# Patient Record
Sex: MALE | Race: WHITE | NOT HISPANIC OR LATINO | Employment: FULL TIME | URBAN - METROPOLITAN AREA
[De-identification: names, ages, dates, MRNs, and addresses within clinical notes are randomized per-mention and may not be internally consistent; named-entity substitution may affect disease eponyms.]

---

## 2017-01-11 ENCOUNTER — HISTORICAL (OUTPATIENT)
Dept: LAB | Facility: HOSPITAL | Age: 47
End: 2017-01-11

## 2018-01-18 ENCOUNTER — HISTORICAL (OUTPATIENT)
Dept: LAB | Facility: HOSPITAL | Age: 48
End: 2018-01-18

## 2019-01-21 ENCOUNTER — HISTORICAL (OUTPATIENT)
Dept: LAB | Facility: HOSPITAL | Age: 49
End: 2019-01-21

## 2019-01-21 LAB
ABS NEUT (OLG): 5.93 X10(3)/MCL (ref 2.1–9.2)
ALBUMIN SERPL-MCNC: 4 GM/DL (ref 3.4–5)
ALBUMIN/GLOB SERPL: 1.3 {RATIO}
ALP SERPL-CCNC: 106 UNIT/L (ref 50–136)
ALT SERPL-CCNC: 34 UNIT/L (ref 12–78)
APPEARANCE, UA: CLEAR
AST SERPL-CCNC: 15 UNIT/L (ref 15–37)
BACTERIA SPEC CULT: NORMAL /HPF
BASOPHILS # BLD AUTO: 0 X10(3)/MCL (ref 0–0.2)
BASOPHILS NFR BLD AUTO: 0 %
BILIRUB SERPL-MCNC: 0.5 MG/DL (ref 0.2–1)
BILIRUB UR QL STRIP: NEGATIVE
BILIRUBIN DIRECT+TOT PNL SERPL-MCNC: 0.1 MG/DL (ref 0–0.2)
BILIRUBIN DIRECT+TOT PNL SERPL-MCNC: 0.4 MG/DL (ref 0–0.8)
BUN SERPL-MCNC: 20 MG/DL (ref 7–18)
CALCIUM SERPL-MCNC: 9 MG/DL (ref 8.5–10.1)
CHLORIDE SERPL-SCNC: 106 MMOL/L (ref 98–107)
CHOLEST SERPL-MCNC: 191 MG/DL (ref 0–200)
CHOLEST/HDLC SERPL: 3.9 {RATIO} (ref 0–5)
CO2 SERPL-SCNC: 23 MMOL/L (ref 21–32)
COLOR UR: YELLOW
CREAT SERPL-MCNC: 1.17 MG/DL (ref 0.7–1.3)
DEPRECATED CALCIDIOL+CALCIFEROL SERPL-MC: 17.59 NG/ML (ref 30–80)
EOSINOPHIL # BLD AUTO: 0 X10(3)/MCL (ref 0–0.9)
EOSINOPHIL NFR BLD AUTO: 0 %
ERYTHROCYTE [DISTWIDTH] IN BLOOD BY AUTOMATED COUNT: 13 % (ref 11.5–17)
EST. AVERAGE GLUCOSE BLD GHB EST-MCNC: 123 MG/DL
GLOBULIN SER-MCNC: 3.1 GM/DL (ref 2.4–3.5)
GLUCOSE (UA): NEGATIVE
GLUCOSE SERPL-MCNC: 102 MG/DL (ref 74–106)
HBA1C MFR BLD: 5.9 % (ref 4.2–6.3)
HCT VFR BLD AUTO: 48.3 % (ref 42–52)
HDLC SERPL-MCNC: 49 MG/DL (ref 35–60)
HGB BLD-MCNC: 15.7 GM/DL (ref 14–18)
HGB UR QL STRIP: NEGATIVE
INR PPP: 2.7 (ref 0–1.3)
KETONES UR QL STRIP: NEGATIVE
LDLC SERPL CALC-MCNC: 107 MG/DL (ref 0–129)
LEUKOCYTE ESTERASE UR QL STRIP: NEGATIVE
LYMPHOCYTES # BLD AUTO: 0.7 X10(3)/MCL (ref 0.6–4.6)
LYMPHOCYTES NFR BLD AUTO: 10 %
MCH RBC QN AUTO: 29.2 PG (ref 27–31)
MCHC RBC AUTO-ENTMCNC: 32.5 GM/DL (ref 33–36)
MCV RBC AUTO: 89.9 FL (ref 80–94)
MONOCYTES # BLD AUTO: 0.6 X10(3)/MCL (ref 0.1–1.3)
MONOCYTES NFR BLD AUTO: 8 %
NEUTROPHILS # BLD AUTO: 5.93 X10(3)/MCL (ref 2.1–9.2)
NEUTROPHILS NFR BLD AUTO: 81 %
NITRITE UR QL STRIP: NEGATIVE
PH UR STRIP: 5 [PH] (ref 5–9)
PLATELET # BLD AUTO: 151 X10(3)/MCL (ref 130–400)
PMV BLD AUTO: 12 FL (ref 9.4–12.4)
POTASSIUM SERPL-SCNC: 4.3 MMOL/L (ref 3.5–5.1)
PROT SERPL-MCNC: 7.1 GM/DL (ref 6.4–8.2)
PROT UR QL STRIP: NEGATIVE
PROTHROMBIN TIME: 29.4 SECOND(S) (ref 12.2–14.7)
PSA SERPL-MCNC: 1.12 NG/ML (ref 0–4)
RBC # BLD AUTO: 5.37 X10(6)/MCL (ref 4.7–6.1)
RBC #/AREA URNS HPF: NORMAL /[HPF]
SODIUM SERPL-SCNC: 140 MMOL/L (ref 136–145)
SP GR UR STRIP: 1.03 (ref 1–1.03)
SQUAMOUS EPITHELIAL, UA: NORMAL
TRIGL SERPL-MCNC: 177 MG/DL (ref 30–150)
TSH SERPL-ACNC: 1.87 MIU/L (ref 0.36–3.74)
UROBILINOGEN UR STRIP-ACNC: 0.2
VIT B12 SERPL-MCNC: 289 PG/ML (ref 193–986)
VLDLC SERPL CALC-MCNC: 35 MG/DL
WBC # SPEC AUTO: 7.3 X10(3)/MCL (ref 4.5–11.5)
WBC #/AREA URNS HPF: NORMAL /HPF

## 2020-01-22 ENCOUNTER — HISTORICAL (OUTPATIENT)
Dept: LAB | Facility: HOSPITAL | Age: 50
End: 2020-01-22

## 2022-01-14 ENCOUNTER — HISTORICAL (OUTPATIENT)
Dept: ADMINISTRATIVE | Facility: HOSPITAL | Age: 52
End: 2022-01-14

## 2022-01-14 LAB
ABS NEUT (OLG): 3.46 X10(3)/MCL (ref 2.1–9.2)
ALBUMIN SERPL-MCNC: 4 GM/DL (ref 3.5–5)
ALBUMIN/GLOB SERPL: 1.6 RATIO (ref 1.1–2)
ALP SERPL-CCNC: 121 UNIT/L (ref 40–150)
ALT SERPL-CCNC: 23 UNIT/L (ref 0–55)
APPEARANCE, UA: NORMAL
AST SERPL-CCNC: 18 UNIT/L (ref 5–34)
BACTERIA SPEC CULT: NORMAL /HPF
BASOPHILS # BLD AUTO: 0 X10(3)/MCL (ref 0–0.2)
BASOPHILS NFR BLD AUTO: 1 %
BILIRUB SERPL-MCNC: 0.7 MG/DL
BILIRUB UR QL STRIP: NEGATIVE
BILIRUBIN DIRECT+TOT PNL SERPL-MCNC: 0.2 MG/DL (ref 0–0.5)
BILIRUBIN DIRECT+TOT PNL SERPL-MCNC: 0.5 MG/DL (ref 0–0.8)
BUN SERPL-MCNC: 17.5 MG/DL (ref 8.4–25.7)
CALCIUM SERPL-MCNC: 9.1 MG/DL (ref 8.7–10.5)
CHLORIDE SERPL-SCNC: 107 MMOL/L (ref 98–107)
CHOLEST SERPL-MCNC: 189 MG/DL
CHOLEST/HDLC SERPL: 4 {RATIO} (ref 0–5)
CO2 SERPL-SCNC: 23 MMOL/L (ref 22–29)
COLOR UR: YELLOW
CREAT SERPL-MCNC: 0.96 MG/DL (ref 0.73–1.18)
DEPRECATED CALCIDIOL+CALCIFEROL SERPL-MC: 21.9 NG/ML (ref 30–80)
EOSINOPHIL # BLD AUTO: 0 X10(3)/MCL (ref 0–0.9)
EOSINOPHIL NFR BLD AUTO: 0 %
ERYTHROCYTE [DISTWIDTH] IN BLOOD BY AUTOMATED COUNT: 13.4 % (ref 11.5–17)
EST. AVERAGE GLUCOSE BLD GHB EST-MCNC: 111.2 MG/DL
GLOBULIN SER-MCNC: 2.5 GM/DL (ref 2.4–3.5)
GLUCOSE (UA): NEGATIVE
GLUCOSE SERPL-MCNC: 94 MG/DL (ref 74–100)
HBA1C MFR BLD: 5.5 %
HCT VFR BLD AUTO: 45.3 % (ref 42–52)
HDLC SERPL-MCNC: 48 MG/DL (ref 35–60)
HGB BLD-MCNC: 14.8 GM/DL (ref 14–18)
HGB UR QL STRIP: NEGATIVE
KETONES UR QL STRIP: NEGATIVE
LDLC SERPL CALC-MCNC: 125 MG/DL (ref 50–140)
LEUKOCYTE ESTERASE UR QL STRIP: NEGATIVE
LYMPHOCYTES # BLD AUTO: 0.9 X10(3)/MCL (ref 0.6–4.6)
LYMPHOCYTES NFR BLD AUTO: 19 %
MCH RBC QN AUTO: 30.3 PG (ref 27–31)
MCHC RBC AUTO-ENTMCNC: 32.7 GM/DL (ref 33–36)
MCV RBC AUTO: 92.6 FL (ref 80–94)
MONOCYTES # BLD AUTO: 0.5 X10(3)/MCL (ref 0.1–1.3)
MONOCYTES NFR BLD AUTO: 10 %
NEUTROPHILS # BLD AUTO: 3.46 X10(3)/MCL (ref 2.1–9.2)
NEUTROPHILS NFR BLD AUTO: 70 %
NITRITE UR QL STRIP: NEGATIVE
PH UR STRIP: 6 [PH] (ref 5–9)
PLATELET # BLD AUTO: 150 X10(3)/MCL (ref 130–400)
PMV BLD AUTO: 11.7 FL (ref 9.4–12.4)
POTASSIUM SERPL-SCNC: 4.1 MMOL/L (ref 3.5–5.1)
PROT SERPL-MCNC: 6.5 GM/DL (ref 6.4–8.3)
PROT UR QL STRIP: NEGATIVE
PSA SERPL-MCNC: 1.23 NG/ML
RBC # BLD AUTO: 4.89 X10(6)/MCL (ref 4.7–6.1)
RBC #/AREA URNS HPF: NORMAL /[HPF]
SODIUM SERPL-SCNC: 141 MMOL/L (ref 136–145)
SP GR UR STRIP: >1.03 (ref 1–1.03)
SQUAMOUS EPITHELIAL, UA: NORMAL /HPF
TRIGL SERPL-MCNC: 79 MG/DL (ref 34–140)
TSH SERPL-ACNC: 0.8 UIU/ML (ref 0.35–4.94)
UROBILINOGEN UR STRIP-ACNC: 0.2
VLDLC SERPL CALC-MCNC: 16 MG/DL
WBC # SPEC AUTO: 4.9 X10(3)/MCL (ref 4.5–11.5)
WBC #/AREA URNS HPF: NORMAL /HPF

## 2022-04-07 ENCOUNTER — HISTORICAL (OUTPATIENT)
Dept: ADMINISTRATIVE | Facility: HOSPITAL | Age: 52
End: 2022-04-07
Payer: COMMERCIAL

## 2022-04-23 VITALS
BODY MASS INDEX: 25.08 KG/M2 | HEIGHT: 72 IN | OXYGEN SATURATION: 98 % | DIASTOLIC BLOOD PRESSURE: 64 MMHG | WEIGHT: 185.19 LBS | SYSTOLIC BLOOD PRESSURE: 110 MMHG

## 2022-12-07 ENCOUNTER — TELEPHONE (OUTPATIENT)
Dept: FAMILY MEDICINE | Facility: CLINIC | Age: 52
End: 2022-12-07
Payer: COMMERCIAL

## 2022-12-07 NOTE — TELEPHONE ENCOUNTER
----- Message from Karsten Marie sent at 12/7/2022 11:16 AM CST -----  .Type:  Needs Medical Advice    Who Called: Artur   Symptoms (please be specific):    How long has patient had these symptoms:    Pharmacy name and phone #:    Would the patient rather a call back or a response via [facility]?   Best Call Back Number: [phone]  Additional Information: He needs a call back from the nurse re: if he ever had a whooping cough vaccine.

## 2022-12-13 ENCOUNTER — PATIENT OUTREACH (OUTPATIENT)
Dept: ADMINISTRATIVE | Facility: HOSPITAL | Age: 52
End: 2022-12-13
Payer: COMMERCIAL

## 2023-01-30 ENCOUNTER — OFFICE VISIT (OUTPATIENT)
Dept: FAMILY MEDICINE | Facility: CLINIC | Age: 53
End: 2023-01-30
Payer: COMMERCIAL

## 2023-01-30 VITALS
SYSTOLIC BLOOD PRESSURE: 112 MMHG | DIASTOLIC BLOOD PRESSURE: 71 MMHG | BODY MASS INDEX: 24.74 KG/M2 | OXYGEN SATURATION: 98 % | HEART RATE: 52 BPM | WEIGHT: 182.38 LBS

## 2023-01-30 DIAGNOSIS — Z12.5 SCREENING PSA (PROSTATE SPECIFIC ANTIGEN): ICD-10-CM

## 2023-01-30 DIAGNOSIS — E55.9 VITAMIN D DEFICIENCY: ICD-10-CM

## 2023-01-30 DIAGNOSIS — Z00.00 WELLNESS EXAMINATION: Primary | ICD-10-CM

## 2023-01-30 DIAGNOSIS — I70.244 ATHEROSCLEROSIS OF NATIVE ARTERIES OF LEFT LEG WITH ULCERATION OF HEEL AND MIDFOOT: ICD-10-CM

## 2023-01-30 DIAGNOSIS — Z23 FLU VACCINE NEED: ICD-10-CM

## 2023-01-30 DIAGNOSIS — Z79.01 CURRENT USE OF LONG TERM ANTICOAGULATION: ICD-10-CM

## 2023-01-30 DIAGNOSIS — I82.4Y3 DEEP VEIN THROMBOSIS (DVT) OF PROXIMAL VEIN OF BOTH LOWER EXTREMITIES, UNSPECIFIED CHRONICITY: ICD-10-CM

## 2023-01-30 DIAGNOSIS — Z86.711 HISTORY OF PULMONARY EMBOLUS (PE): ICD-10-CM

## 2023-01-30 PROCEDURE — 1159F MED LIST DOCD IN RCRD: CPT | Mod: CPTII,,, | Performed by: FAMILY MEDICINE

## 2023-01-30 PROCEDURE — 3074F PR MOST RECENT SYSTOLIC BLOOD PRESSURE < 130 MM HG: ICD-10-PCS | Mod: CPTII,,, | Performed by: FAMILY MEDICINE

## 2023-01-30 PROCEDURE — 3078F DIAST BP <80 MM HG: CPT | Mod: CPTII,,, | Performed by: FAMILY MEDICINE

## 2023-01-30 PROCEDURE — 90686 IIV4 VACC NO PRSV 0.5 ML IM: CPT | Mod: ,,, | Performed by: FAMILY MEDICINE

## 2023-01-30 PROCEDURE — 3008F BODY MASS INDEX DOCD: CPT | Mod: CPTII,,, | Performed by: FAMILY MEDICINE

## 2023-01-30 PROCEDURE — 90471 IMMUNIZATION ADMIN: CPT | Mod: ,,, | Performed by: FAMILY MEDICINE

## 2023-01-30 PROCEDURE — 99396 PR PREVENTIVE VISIT,EST,40-64: ICD-10-PCS | Mod: 25,,, | Performed by: FAMILY MEDICINE

## 2023-01-30 PROCEDURE — 1159F PR MEDICATION LIST DOCUMENTED IN MEDICAL RECORD: ICD-10-PCS | Mod: CPTII,,, | Performed by: FAMILY MEDICINE

## 2023-01-30 PROCEDURE — 99396 PREV VISIT EST AGE 40-64: CPT | Mod: 25,,, | Performed by: FAMILY MEDICINE

## 2023-01-30 PROCEDURE — 1160F PR REVIEW ALL MEDS BY PRESCRIBER/CLIN PHARMACIST DOCUMENTED: ICD-10-PCS | Mod: CPTII,,, | Performed by: FAMILY MEDICINE

## 2023-01-30 PROCEDURE — 3078F PR MOST RECENT DIASTOLIC BLOOD PRESSURE < 80 MM HG: ICD-10-PCS | Mod: CPTII,,, | Performed by: FAMILY MEDICINE

## 2023-01-30 PROCEDURE — 90686 PR FLU VACCINE, QIIV4, NO PRSV, 0.5 ML, IM: ICD-10-PCS | Mod: ,,, | Performed by: FAMILY MEDICINE

## 2023-01-30 PROCEDURE — 3008F PR BODY MASS INDEX (BMI) DOCUMENTED: ICD-10-PCS | Mod: CPTII,,, | Performed by: FAMILY MEDICINE

## 2023-01-30 PROCEDURE — 90471 PR IMMUNIZ ADMIN,1 SINGLE/COMB VAC/TOXOID: ICD-10-PCS | Mod: ,,, | Performed by: FAMILY MEDICINE

## 2023-01-30 PROCEDURE — 1160F RVW MEDS BY RX/DR IN RCRD: CPT | Mod: CPTII,,, | Performed by: FAMILY MEDICINE

## 2023-01-30 PROCEDURE — 3074F SYST BP LT 130 MM HG: CPT | Mod: CPTII,,, | Performed by: FAMILY MEDICINE

## 2023-01-30 RX ORDER — WARFARIN 4 MG/1
4 TABLET ORAL DAILY
COMMUNITY
Start: 2022-12-30

## 2023-01-30 NOTE — PROGRESS NOTES
Patient ID: [id number]     Chief Complaint: WELLNESS        HPI:     Artur Oquendo III is a 52 y.o. male here today for annual wellness exam.  Well Adult History   The patient presents for well adult exam, The patient's general health status is described as good. The patient's diet is described as balanced. Exercise: occasional. Associated symptoms consist of denies weight loss, denies weight gain, denies fatigue, denies headache, reports hearing loss (chronic and stable) and denies vision changes. Additional pertinent history: last dental exam: goes every 6 months, last eye exam: 02/2022, with Ophthalmology- Dr. Diaz), seat belt use, occasional caffeine use (tea/soft drinks), tobacco use none, social alcohol use and He is due for annual wellness labs, he would like HIV and Hep C screening with labs. Last Cologuard: 08/04/2021, Negative. He needs flu vaccine today, but he is UTD on all other vaccines. He is compliant with Vascular (Dr. Mcdonough) followup as scheduled for Coumadin management due to history of PE/DVT s/p IV Filter/atherosclerosis with healed wound on left foot, asymptomatic. Her reports that sleep apnea symptoms resolved s/p deviated septum repair with ENT (Dr. Tanner), he does followup with ENT as scheduled. He has hearing loss and sees [facility] and balance for hearing aids.  - Patient is without any other complaints today.    Advance Care Planning     Date: 01/30/2023  Patient did not wish or was not able to name a surrogate decision maker or provide an Advance Care Plan.      No past medical history on file.     History reviewed. No pertinent surgical history.    Review of patient's allergies indicates:  No Known Allergies    No outpatient medications have been marked as taking for the 1/30/23 encounter (Office Visit) with Stacy Kemp MD.     Current Facility-Administered Medications for the 1/30/23 encounter (Office Visit) with Stacy Kemp MD   Medication Dose  Route Frequency Provider Last Rate Last Admin    influenza (QUADRIVALENT PF) vaccine 0.5 mL  0.5 mL Intramuscular vaccine x 1 dose Stacy Kemp MD           Social History     Socioeconomic History    Marital status: Other   Tobacco Use    Smoking status: Never    Smokeless tobacco: Never   Substance and Sexual Activity    Alcohol use: Never    Drug use: Never    Sexual activity: Yes        History reviewed. No pertinent family history.     Subjective:       Review of Systems:    See HPI for details    Constitutional: No fatigue, No fever, No chills.   Eye: No blurring, No visual disturbances.   Ear/Nose/Mouth/Throat: No ear pain, No nasal congestion, No sore throat Decreased hearing: Bilaterally.   Respiratory: No shortness of breath, No cough, No wheezing.   Cardiovascular: No chest pain, No palpitations, No peripheral edema.   Gastrointestinal: No nausea, No vomiting, No diarrhea, No constipation, No abdominal pain.   Genitourinary: No dysuria, No hematuria.   Hematology/Lymphatics: No bruising tendency, No bleeding tendency, No swollen lymph glands.   Endocrine: No excessive thirst, No polyuria, No excessive hunger.   Musculoskeletal: No joint pain, No muscle pain, No decreased range of motion.   Integumentary: No rash, No pruritus.   Neurologic: No abnormal balance, No confusion, No headache.   Psychiatric: No sleeping problems, No anxiety, No depression, Not suicidal, No hallucinations.     All Other ROS: Negative except as stated in HPI.       Objective:     /71 (BP Location: Right arm, Patient Position: Sitting, BP Method: Large (Automatic))   Pulse (!) 52   Wt 82.7 kg (182 lb 6.4 oz)   SpO2 98%   BMI 24.74 kg/m²     Physical Exam    General: Alert and oriented, No acute distress.   Eye: Pupils are equal, round and reactive to light, Extraocular movements are intact, Normal conjunctiva.   HENT: Normocephalic, Tympanic membranes are clear, Normal hearing, Oral mucosa is moist, No pharyngeal  erythema, Hearing aids in place.   Throat: Pharynx ( Not edematous, No exudate ).   Neck: Supple, Non-tender, No carotid bruit, No lymphadenopathy, No thyromegaly.   Respiratory: Lungs are clear to auscultation, Respirations are non-labored, Breath sounds are equal, Symmetrical chest wall expansion, No chest wall tenderness.   Cardiovascular: Normal rate, Regular rhythm, No murmur, Good pulses equal in all extremities, No edema.   Gastrointestinal: Soft, Non-tender, Non-distended, Normal bowel sounds, No organomegaly.   Genitourinary: No costovertebral angle tenderness.   Musculoskeletal: Normal range of motion, No tenderness, Normal gait.   Lower extremity exam: Lower leg ( Bilateral, Moderate, Varicose veins, venous stasis changes ).   Neurologic: No focal deficits, Cranial Nerves II-XII are grossly intact.   Psychiatric: Cooperative, Appropriate mood & affect, Normal judgment, Non-suicidal.   Mood and affect: Calm.   Behavior: Relaxed.        Assessment:       ICD-10-CM ICD-9-CM   1. Wellness examination  Z00.00 V70.0   2. Screening PSA (prostate specific antigen)  Z12.5 V76.44   3. Vitamin D deficiency  E55.9 268.9   4. Flu vaccine need  Z23 V04.81   5. Deep vein thrombosis (DVT) of proximal vein of both lower extremities, unspecified chronicity  I82.4Y3 453.41   6. History of pulmonary embolus (PE)  Z86.711 V12.55   7. Atherosclerosis of native arteries of left leg with ulceration of heel and midfoot  I70.244 440.23     707.10   8. Current use of long term anticoagulation  Z79.01 V58.61        Plan:     Problem List Items Addressed This Visit          Cardiac/Vascular    Atherosclerosis of native arteries of left leg with ulceration of heel and midfoot     Other Visit Diagnoses       Wellness examination    -  Primary    Relevant Orders    CBC Auto Differential    Comprehensive Metabolic Panel    Hemoglobin A1C    Lipid Panel    TSH    Urinalysis, Reflex to Urine Culture    HIV 1/2 Ag/Ab (4th Gen)    Hepatitis  C Antibody    Screening PSA (prostate specific antigen)        Relevant Orders    PSA, Screening    Vitamin D deficiency        Relevant Orders    Vitamin D    Flu vaccine need        Relevant Medications    influenza (QUADRIVALENT PF) vaccine 0.5 mL (Start on 1/30/2023 11:03 AM)    Deep vein thrombosis (DVT) of proximal vein of both lower extremities, unspecified chronicity        History of pulmonary embolus (PE)        Current use of long term anticoagulation        Relevant Orders    Protime-INR         1. Wellness examination  - CBC Auto Differential; Future  - Comprehensive Metabolic Panel; Future  - Hemoglobin A1C; Future  - Lipid Panel; Future  - TSH; Future  - Urinalysis, Reflex to Urine Culture; Future  - HIV 1/2 Ag/Ab (4th Gen); Future  - Hepatitis C Antibody; Future  - Will treat pending lab results. Continue healthy diet and exercise plan. Keep appointment for dental exams x q6 months as scheduled. Keep appointment for annual eye exam as scheduled. Keep appointment with specialists as scheduled. Notify M.D. or ER if temp greater than 100.4, or any acute illness.      2. Screening PSA (prostate specific antigen)  - PSA, Screening; Future    3. Vitamin D deficiency  - Vitamin D; Future      4. Flu vaccine need  - influenza (QUADRIVALENT PF) vaccine 0.5 mL IM x 1 today.    5. Deep vein thrombosis (DVT) of proximal vein of both lower extremities, unspecified chronicity  Continue followup with Vascular as scheduled and continue current treatment plan. Notify M.D. or ER if symptoms persist or worsen, SOB, extremity swelling, active bleeding, temp >100.4, or any acute illness.      6. History of pulmonary embolus (PE)  - Same as #5.     7. Atherosclerosis of native arteries of left leg with ulceration of heel and midfoot  - Same as #5.     8. Current use of long term anticoagulation  - Protime-INR; Future  - Same as #5.       Artur was seen today for wellness.    Diagnoses and all orders for this  visit:    Wellness examination  -     CBC Auto Differential; Future  -     Comprehensive Metabolic Panel; Future  -     Hemoglobin A1C; Future  -     Lipid Panel; Future  -     TSH; Future  -     Urinalysis, Reflex to Urine Culture; Future  -     HIV 1/2 Ag/Ab (4th Gen); Future  -     Hepatitis C Antibody; Future    Screening PSA (prostate specific antigen)  -     PSA, Screening; Future    Vitamin D deficiency  -     Vitamin D; Future    Flu vaccine need  -     influenza (QUADRIVALENT PF) vaccine 0.5 mL    Deep vein thrombosis (DVT) of proximal vein of both lower extremities, unspecified chronicity    History of pulmonary embolus (PE)    Atherosclerosis of native arteries of left leg with ulceration of heel and midfoot    Current use of long term anticoagulation  -     Protime-INR; Future          Medication List with Changes/Refills   Current Medications    WARFARIN (COUMADIN) 4 MG TABLET    Take 4 mg by mouth Daily.       Start Date: 12/30/2022End Date: --          Follow up in about 1 year (around 1/30/2024) for Wellness.

## 2024-01-31 ENCOUNTER — OFFICE VISIT (OUTPATIENT)
Dept: FAMILY MEDICINE | Facility: CLINIC | Age: 54
End: 2024-01-31
Payer: COMMERCIAL

## 2024-01-31 VITALS
HEART RATE: 51 BPM | WEIGHT: 190 LBS | DIASTOLIC BLOOD PRESSURE: 74 MMHG | SYSTOLIC BLOOD PRESSURE: 136 MMHG | RESPIRATION RATE: 17 BRPM | OXYGEN SATURATION: 99 % | BODY MASS INDEX: 25.73 KG/M2 | HEIGHT: 72 IN

## 2024-01-31 DIAGNOSIS — Z12.11 SCREENING FOR COLON CANCER: ICD-10-CM

## 2024-01-31 DIAGNOSIS — I82.4Y3 DEEP VEIN THROMBOSIS (DVT) OF PROXIMAL VEIN OF BOTH LOWER EXTREMITIES, UNSPECIFIED CHRONICITY: ICD-10-CM

## 2024-01-31 DIAGNOSIS — Z00.00 WELLNESS EXAMINATION: Primary | ICD-10-CM

## 2024-01-31 DIAGNOSIS — Z79.01 CURRENT USE OF LONG TERM ANTICOAGULATION: ICD-10-CM

## 2024-01-31 DIAGNOSIS — I70.244 ATHEROSCLEROSIS OF NATIVE ARTERIES OF LEFT LEG WITH ULCERATION OF HEEL AND MIDFOOT: ICD-10-CM

## 2024-01-31 DIAGNOSIS — E55.9 VITAMIN D DEFICIENCY: ICD-10-CM

## 2024-01-31 DIAGNOSIS — Z23 FLU VACCINE NEED: ICD-10-CM

## 2024-01-31 DIAGNOSIS — Z12.5 SCREENING PSA (PROSTATE SPECIFIC ANTIGEN): ICD-10-CM

## 2024-01-31 PROCEDURE — 1159F MED LIST DOCD IN RCRD: CPT | Mod: CPTII,,, | Performed by: FAMILY MEDICINE

## 2024-01-31 PROCEDURE — 3075F SYST BP GE 130 - 139MM HG: CPT | Mod: CPTII,,, | Performed by: FAMILY MEDICINE

## 2024-01-31 PROCEDURE — 1160F RVW MEDS BY RX/DR IN RCRD: CPT | Mod: CPTII,,, | Performed by: FAMILY MEDICINE

## 2024-01-31 PROCEDURE — 3008F BODY MASS INDEX DOCD: CPT | Mod: CPTII,,, | Performed by: FAMILY MEDICINE

## 2024-01-31 PROCEDURE — 99396 PREV VISIT EST AGE 40-64: CPT | Mod: 25,,, | Performed by: FAMILY MEDICINE

## 2024-01-31 PROCEDURE — 90471 IMMUNIZATION ADMIN: CPT | Mod: ,,, | Performed by: FAMILY MEDICINE

## 2024-01-31 PROCEDURE — 3078F DIAST BP <80 MM HG: CPT | Mod: CPTII,,, | Performed by: FAMILY MEDICINE

## 2024-01-31 PROCEDURE — 90686 IIV4 VACC NO PRSV 0.5 ML IM: CPT | Mod: ,,, | Performed by: FAMILY MEDICINE

## 2024-01-31 NOTE — PATIENT INSTRUCTIONS
Sukhwinder Siddiqui,     If you are due for any health screening(s) below please notify me so we can arrange them to be ordered and scheduled. Most healthy patients at your age complete them, but you are free to accept or refuse.     If you can't do it, I'll definitely understand. If you can, I'd certainly appreciate it!    All of your core healthy metrics are met.

## 2024-01-31 NOTE — PROGRESS NOTES
Patient ID: [id number]     Chief Complaint: Annual Exam        HPI:     Artur Oquendo III is a 53 y.o. male here today for annual wellness exam.   Well Adult History   The patient presents for well adult exam, The patient's general health status is described as good. The patient's diet is described as balanced. Exercise: occasional. Associated symptoms consist of denies weight loss, denies weight gain, denies fatigue, denies headache, reports hearing loss (chronic and stable) and denies vision changes. Additional pertinent history: last dental exam: goes every 6 months, last eye exam: 08/2023, with Ophthalmology- Dr. Diaz), seat belt use, occasional caffeine use (tea/soft drinks), tobacco use none, social alcohol use and He is due for annual wellness labs, he would like HIV and Hep C screening with labs. Last Cologuard: 08/04/2021, Negative, due for repeat in 08/2024. He needs flu vaccine today, but he is UTD on all other vaccines. He is compliant with Vascular (Dr. Mcdonough) followup as scheduled for Coumadin management due to history of PE/DVT s/p IV Filter/atherosclerosis with healed wound on left foot, asymptomatic, stable with Rx, no side effects. Her reports that sleep apnea symptoms resolved s/p deviated septum repair with ENT (Dr. Tanner), he does followup with ENT as scheduled. He has hearing loss and sees [facility] and [facility] for hearing aids.  - Patient is without any other complaints today.     Advance Care Planning     Date: 01/31/2024  Patient did not wish or was not able to name a surrogate decision maker or provide an Advance Care Plan.            No past medical history on file.     History reviewed. No pertinent surgical history.    Review of patient's allergies indicates:  No Known Allergies    Outpatient Medications Marked as Taking for the 1/31/24 encounter (Office Visit) with Stacy Kemp MD   Medication Sig Dispense Refill    warfarin (COUMADIN) 4 MG tablet Take 4 mg by  mouth Daily.       Current Facility-Administered Medications for the 1/31/24 encounter (Office Visit) with Stacy Kemp MD   Medication Dose Route Frequency Provider Last Rate Last Admin    influenza (QUADRIVALENT PF) vaccine 0.5 mL  0.5 mL Intramuscular vaccine x 1 dose Stacy Kemp MD           Social History     Socioeconomic History    Marital status: Other   Tobacco Use    Smoking status: Never    Smokeless tobacco: Never   Substance and Sexual Activity    Alcohol use: Not Currently     Comment: Once in a while    Drug use: Never    Sexual activity: Yes     Partners: Female     Birth control/protection: None        History reviewed. No pertinent family history.     Subjective:       Review of Systems:    See HPI for details    Constitutional: No fatigue, No fever, No chills.   Eye: No blurring, No visual disturbances.   Ear/Nose/Mouth/Throat: No ear pain, No nasal congestion, No sore throat. Decreased hearing: Bilaterally.   Respiratory: No shortness of breath, No cough, No wheezing.   Cardiovascular: No chest pain, No palpitations, No peripheral edema.   Gastrointestinal: No nausea, No vomiting, No diarrhea, No constipation, No abdominal pain.   Genitourinary: No dysuria, No hematuria.   Hematology/Lymphatics: No bruising tendency, No bleeding tendency, No swollen lymph glands.   Endocrine: No excessive thirst, No polyuria, No excessive hunger.   Musculoskeletal: No joint pain, No muscle pain, No decreased range of motion.   Integumentary: No rash, No pruritus.   Neurologic: No abnormal balance, No confusion, No headache.   Psychiatric: No sleeping problems, No anxiety, No depression, Not suicidal, No hallucinations.     All Other ROS: Negative except as stated in HPI.       Objective:     /74 (BP Location: Left arm, Patient Position: Sitting, BP Method: Medium (Automatic))   Pulse (!) 51   Resp 17   Ht 6' (1.829 m)   Wt 86.2 kg (190 lb)   SpO2 99%   BMI 25.77 kg/m²     Physical  Exam    General: Alert and oriented, No acute distress.   Eye: Pupils are equal, round and reactive to light, Extraocular movements are intact, Normal conjunctiva.   HENT: Normocephalic, Tympanic membranes are clear, Normal hearing, Oral mucosa is moist, No pharyngeal erythema, Hearing aids in place.   Throat: Pharynx ( Not edematous, No exudate ).   Neck: Supple, Non-tender, No carotid bruit, No lymphadenopathy, No thyromegaly.   Respiratory: Lungs are clear to auscultation, Respirations are non-labored, Breath sounds are equal, Symmetrical chest wall expansion, No chest wall tenderness.   Cardiovascular: Normal rate, Regular rhythm, No murmur, Good pulses equal in all extremities, No edema.   Gastrointestinal: Soft, Non-tender, Non-distended, Normal bowel sounds, No organomegaly.   Genitourinary: No costovertebral angle tenderness.   Musculoskeletal: Normal range of motion, No tenderness, Normal gait.   Lower extremity exam: Lower leg ( Bilateral, Moderate, Varicose veins, venous stasis changes ).   Neurologic: No focal deficits, Cranial Nerves II-XII are grossly intact.   Psychiatric: Cooperative, Appropriate mood & affect, Normal judgment, Non-suicidal.   Mood and affect: Calm.   Behavior: Relaxed.         Assessment:       ICD-10-CM ICD-9-CM   1. Wellness examination  Z00.00 V70.0   2. Screening PSA (prostate specific antigen)  Z12.5 V76.44   3. Screening for colon cancer  Z12.11 V76.51   4. Vitamin D deficiency  E55.9 268.9   5. Flu vaccine need  Z23 V04.81   6. Deep vein thrombosis (DVT) of proximal vein of both lower extremities, unspecified chronicity  I82.4Y3 453.41   7. Atherosclerosis of native arteries of left leg with ulceration of heel and midfoot  I70.244 440.23     707.10   8. Current use of long term anticoagulation  Z79.01 V58.61        Plan:     Problem List Items Addressed This Visit          Cardiac/Vascular    Atherosclerosis of native arteries of left leg with ulceration of heel and midfoot     Relevant Orders    Protime-INR    APTT     Other Visit Diagnoses       Wellness examination    -  Primary    Relevant Orders    Hepatitis C Antibody    HIV 1/2 Ag/Ab (4th Gen)    CBC Auto Differential    Comprehensive Metabolic Panel    Hemoglobin A1C    Lipid Panel    TSH    Urinalysis, Reflex to Urine Culture    Screening PSA (prostate specific antigen)        Relevant Orders    PSA, Screening    Screening for colon cancer        Relevant Orders    Cologuard Screening (Multitarget Stool DNA)    Vitamin D deficiency        Relevant Orders    Vitamin D    Flu vaccine need        Relevant Medications    influenza (QUADRIVALENT PF) vaccine 0.5 mL (Start on 1/31/2024  9:44 AM)    Deep vein thrombosis (DVT) of proximal vein of both lower extremities, unspecified chronicity        Relevant Orders    Protime-INR    APTT    Current use of long term anticoagulation        Relevant Orders    Protime-INR    APTT         1. Wellness examination  - Hepatitis C Antibody; Future  - HIV 1/2 Ag/Ab (4th Gen); Future  - CBC Auto Differential; Future  - Comprehensive Metabolic Panel; Future  - Hemoglobin A1C; Future  - Lipid Panel; Future  - TSH; Future  - Urinalysis, Reflex to Urine Culture; Future  - Will treat pending lab results. Continue healthy diet and exercise plan. Keep appointment for dental exams x q6 months as scheduled. Keep appointment for annual eye exam as scheduled. Keep appointment with specialists as scheduled. Notify M.D. or ER if temp greater than 100.4, or any acute illness.       2. Screening PSA (prostate specific antigen)  - PSA, Screening; Future    3. Screening for colon cancer  - Cologuard Screening (Multitarget Stool DNA); Future  - Cologuard Screening (Multitarget Stool DNA)    4. Vitamin D deficiency  - Vitamin D; Future    5. Flu vaccine need  - influenza (QUADRIVALENT PF) vaccine 0.5 mL IM x 1 today.     6. Deep vein thrombosis (DVT) of proximal vein of both lower extremities, unspecified  chronicity  - Protime-INR; Future  - APTT; Future  - Continue followup with Vascular as scheduled and continue current treatment plan. Notify M.D. or ER if symptoms persist or worsen, SOB, extremity swelling, active bleeding, temp >100.4, or any acute illness.       7. Atherosclerosis of native arteries of left leg with ulceration of heel and midfoot  - Protime-INR; Future  - APTT; Future  - Same as #6.     8. Current use of long term anticoagulation  - Protime-INR; Future  - APTT; Future  - Same as #6.       Artur was seen today for annual exam.    Diagnoses and all orders for this visit:    Wellness examination  -     Hepatitis C Antibody; Future  -     HIV 1/2 Ag/Ab (4th Gen); Future  -     CBC Auto Differential; Future  -     Comprehensive Metabolic Panel; Future  -     Hemoglobin A1C; Future  -     Lipid Panel; Future  -     TSH; Future  -     Urinalysis, Reflex to Urine Culture; Future    Screening PSA (prostate specific antigen)  -     PSA, Screening; Future    Screening for colon cancer  -     Cologuard Screening (Multitarget Stool DNA); Future  -     Cologuard Screening (Multitarget Stool DNA)    Vitamin D deficiency  -     Vitamin D; Future    Flu vaccine need  -     influenza (QUADRIVALENT PF) vaccine 0.5 mL    Deep vein thrombosis (DVT) of proximal vein of both lower extremities, unspecified chronicity  -     Protime-INR; Future  -     APTT; Future    Atherosclerosis of native arteries of left leg with ulceration of heel and midfoot  -     Protime-INR; Future  -     APTT; Future    Current use of long term anticoagulation  -     Protime-INR; Future  -     APTT; Future          Medication List with Changes/Refills   Current Medications    WARFARIN (COUMADIN) 4 MG TABLET    Take 4 mg by mouth Daily.       Start Date: 12/30/2022End Date: --          Follow up in about 1 year (around 1/31/2025) for Wellness.

## 2024-02-09 ENCOUNTER — LAB VISIT (OUTPATIENT)
Dept: LAB | Facility: HOSPITAL | Age: 54
End: 2024-02-09
Attending: FAMILY MEDICINE
Payer: COMMERCIAL

## 2024-02-09 DIAGNOSIS — Z79.01 CURRENT USE OF LONG TERM ANTICOAGULATION: ICD-10-CM

## 2024-02-09 DIAGNOSIS — Z12.5 SCREENING PSA (PROSTATE SPECIFIC ANTIGEN): ICD-10-CM

## 2024-02-09 DIAGNOSIS — I82.4Y3 DEEP VEIN THROMBOSIS (DVT) OF PROXIMAL VEIN OF BOTH LOWER EXTREMITIES, UNSPECIFIED CHRONICITY: ICD-10-CM

## 2024-02-09 DIAGNOSIS — Z00.00 WELLNESS EXAMINATION: ICD-10-CM

## 2024-02-09 DIAGNOSIS — E55.9 VITAMIN D DEFICIENCY: ICD-10-CM

## 2024-02-09 DIAGNOSIS — I70.244 ATHEROSCLEROSIS OF NATIVE ARTERIES OF LEFT LEG WITH ULCERATION OF HEEL AND MIDFOOT: ICD-10-CM

## 2024-02-09 LAB
ALBUMIN SERPL-MCNC: 4 G/DL (ref 3.5–5)
ALBUMIN/GLOB SERPL: 1.5 RATIO (ref 1.1–2)
ALP SERPL-CCNC: 98 UNIT/L (ref 40–150)
ALT SERPL-CCNC: 18 UNIT/L (ref 0–55)
APPEARANCE UR: CLEAR
APTT PPP: 28.8 SECONDS (ref 23.2–33.7)
AST SERPL-CCNC: 15 UNIT/L (ref 5–34)
BACTERIA #/AREA URNS AUTO: ABNORMAL /HPF
BASOPHILS # BLD AUTO: 0.03 X10(3)/MCL
BASOPHILS NFR BLD AUTO: 0.6 %
BILIRUB SERPL-MCNC: 0.5 MG/DL
BILIRUB UR QL STRIP.AUTO: NEGATIVE
BUN SERPL-MCNC: 23.9 MG/DL (ref 8.4–25.7)
CALCIUM SERPL-MCNC: 9 MG/DL (ref 8.4–10.2)
CHLORIDE SERPL-SCNC: 108 MMOL/L (ref 98–107)
CHOLEST SERPL-MCNC: 205 MG/DL
CHOLEST/HDLC SERPL: 4 {RATIO} (ref 0–5)
CO2 SERPL-SCNC: 24 MMOL/L (ref 22–29)
COLOR UR AUTO: ABNORMAL
CREAT SERPL-MCNC: 0.98 MG/DL (ref 0.73–1.18)
DEPRECATED CALCIDIOL+CALCIFEROL SERPL-MC: 26 NG/ML (ref 30–80)
EOSINOPHIL # BLD AUTO: 0.04 X10(3)/MCL (ref 0–0.9)
EOSINOPHIL NFR BLD AUTO: 0.7 %
ERYTHROCYTE [DISTWIDTH] IN BLOOD BY AUTOMATED COUNT: 13 % (ref 11.5–17)
EST. AVERAGE GLUCOSE BLD GHB EST-MCNC: 108.3 MG/DL
GFR SERPLBLD CREATININE-BSD FMLA CKD-EPI: >60 MLS/MIN/1.73/M2
GLOBULIN SER-MCNC: 2.7 GM/DL (ref 2.4–3.5)
GLUCOSE SERPL-MCNC: 100 MG/DL (ref 74–100)
GLUCOSE UR QL STRIP.AUTO: NORMAL
HBA1C MFR BLD: 5.4 %
HCT VFR BLD AUTO: 48.3 % (ref 42–52)
HDLC SERPL-MCNC: 50 MG/DL (ref 35–60)
HGB BLD-MCNC: 15.1 G/DL (ref 14–18)
HIV 1+2 AB+HIV1 P24 AG SERPL QL IA: NONREACTIVE
IMM GRANULOCYTES # BLD AUTO: 0.04 X10(3)/MCL (ref 0–0.04)
IMM GRANULOCYTES NFR BLD AUTO: 0.7 %
INR PPP: 1.5
KETONES UR QL STRIP.AUTO: NEGATIVE
LDLC SERPL CALC-MCNC: 143 MG/DL (ref 50–140)
LEUKOCYTE ESTERASE UR QL STRIP.AUTO: NEGATIVE
LYMPHOCYTES # BLD AUTO: 1.26 X10(3)/MCL (ref 0.6–4.6)
LYMPHOCYTES NFR BLD AUTO: 23.5 %
MCH RBC QN AUTO: 29.5 PG (ref 27–31)
MCHC RBC AUTO-ENTMCNC: 31.3 G/DL (ref 33–36)
MCV RBC AUTO: 94.5 FL (ref 80–94)
MONOCYTES # BLD AUTO: 0.57 X10(3)/MCL (ref 0.1–1.3)
MONOCYTES NFR BLD AUTO: 10.6 %
MUCOUS THREADS URNS QL MICRO: ABNORMAL /LPF
NEUTROPHILS # BLD AUTO: 3.42 X10(3)/MCL (ref 2.1–9.2)
NEUTROPHILS NFR BLD AUTO: 63.9 %
NITRITE UR QL STRIP.AUTO: NEGATIVE
NRBC BLD AUTO-RTO: 0 %
PH UR STRIP.AUTO: 5.5 [PH]
PLATELET # BLD AUTO: 149 X10(3)/MCL (ref 130–400)
PMV BLD AUTO: 11.5 FL (ref 7.4–10.4)
POTASSIUM SERPL-SCNC: 4.6 MMOL/L (ref 3.5–5.1)
PROT SERPL-MCNC: 6.7 GM/DL (ref 6.4–8.3)
PROT UR QL STRIP.AUTO: NEGATIVE
PROTHROMBIN TIME: 18.2 SECONDS (ref 12.5–14.5)
PSA SERPL-MCNC: 0.74 NG/ML
RBC # BLD AUTO: 5.11 X10(6)/MCL (ref 4.7–6.1)
RBC #/AREA URNS AUTO: ABNORMAL /HPF
RBC UR QL AUTO: NEGATIVE
SODIUM SERPL-SCNC: 140 MMOL/L (ref 136–145)
SP GR UR STRIP.AUTO: 1.03 (ref 1–1.03)
SQUAMOUS #/AREA URNS LPF: ABNORMAL /HPF
TRIGL SERPL-MCNC: 61 MG/DL (ref 34–140)
TSH SERPL-ACNC: 1.54 UIU/ML (ref 0.35–4.94)
UROBILINOGEN UR STRIP-ACNC: NORMAL
VLDLC SERPL CALC-MCNC: 12 MG/DL
WBC # SPEC AUTO: 5.36 X10(3)/MCL (ref 4.5–11.5)
WBC #/AREA URNS AUTO: ABNORMAL /HPF

## 2024-02-09 PROCEDURE — 85025 COMPLETE CBC W/AUTO DIFF WBC: CPT

## 2024-02-09 PROCEDURE — 80061 LIPID PANEL: CPT

## 2024-02-09 PROCEDURE — 82306 VITAMIN D 25 HYDROXY: CPT

## 2024-02-09 PROCEDURE — 81001 URINALYSIS AUTO W/SCOPE: CPT

## 2024-02-09 PROCEDURE — 85730 THROMBOPLASTIN TIME PARTIAL: CPT

## 2024-02-09 PROCEDURE — 87389 HIV-1 AG W/HIV-1&-2 AB AG IA: CPT

## 2024-02-09 PROCEDURE — 84443 ASSAY THYROID STIM HORMONE: CPT

## 2024-02-09 PROCEDURE — 83036 HEMOGLOBIN GLYCOSYLATED A1C: CPT

## 2024-02-09 PROCEDURE — 80053 COMPREHEN METABOLIC PANEL: CPT

## 2024-02-09 PROCEDURE — 84153 ASSAY OF PSA TOTAL: CPT

## 2024-02-09 PROCEDURE — 85610 PROTHROMBIN TIME: CPT

## 2024-02-09 PROCEDURE — 36415 COLL VENOUS BLD VENIPUNCTURE: CPT

## 2024-02-09 PROCEDURE — 86803 HEPATITIS C AB TEST: CPT

## 2024-02-10 LAB — HCV AB SERPL QL IA: NONREACTIVE

## 2024-02-14 DIAGNOSIS — E78.2 MIXED HYPERLIPIDEMIA: Primary | ICD-10-CM

## 2024-02-29 ENCOUNTER — TELEPHONE (OUTPATIENT)
Dept: FAMILY MEDICINE | Facility: CLINIC | Age: 54
End: 2024-02-29
Payer: COMMERCIAL

## 2024-02-29 NOTE — TELEPHONE ENCOUNTER
----- Message from Stacy Kemp MD sent at 2/14/2024  1:18 PM CST -----  Vitamin D is mildly low, 26, normal: 30-80, needs to take an over the counter Vitamin D3 2,000 units by mouth daily for maintenance and get 15 minutes of sunlight daily wearing sunscreen, recheck Vitamin D level with annual wellness labs. Coumadin levels appear to be subtherapeutic, please forward results to Vascular (Dr. Mcdonough) for review/treatment recommendations. His cholesterol is mildly elevated, total: 205, normal<200, LDL (bad cholesterol): 143, normal is <130, but his 10-year ASCVD risk score for heart disease/stroke risk is low at 5.3%, normal is <7.5%, he doesn't need cholesterol medication at this time, needs low cholesterol eating plan and exercise, recheck lipid panel with annual wellness labs. Testing is negative for Hepatitis C and HIV. Remaining labs are essentially normal.

## 2024-06-28 ENCOUNTER — TELEPHONE (OUTPATIENT)
Dept: FAMILY MEDICINE | Facility: CLINIC | Age: 54
End: 2024-06-28
Payer: COMMERCIAL

## 2024-06-28 NOTE — TELEPHONE ENCOUNTER
----- Message from Stacy Kemp MD sent at 6/28/2024 10:57 AM CDT -----  COLOGUARD NORMAL-Your Cologuard is normal. Please repeat in 3 years.

## 2025-02-03 ENCOUNTER — OFFICE VISIT (OUTPATIENT)
Dept: FAMILY MEDICINE | Facility: CLINIC | Age: 55
End: 2025-02-03
Payer: COMMERCIAL

## 2025-02-03 VITALS
RESPIRATION RATE: 16 BRPM | SYSTOLIC BLOOD PRESSURE: 119 MMHG | DIASTOLIC BLOOD PRESSURE: 67 MMHG | TEMPERATURE: 98 F | HEART RATE: 54 BPM | OXYGEN SATURATION: 98 % | WEIGHT: 198 LBS | HEIGHT: 72 IN | BODY MASS INDEX: 26.82 KG/M2

## 2025-02-03 DIAGNOSIS — Z23 FLU VACCINE NEED: ICD-10-CM

## 2025-02-03 DIAGNOSIS — Z79.01 CURRENT USE OF LONG TERM ANTICOAGULATION: ICD-10-CM

## 2025-02-03 DIAGNOSIS — Z00.00 WELLNESS EXAMINATION: Primary | ICD-10-CM

## 2025-02-03 DIAGNOSIS — Z12.5 SCREENING PSA (PROSTATE SPECIFIC ANTIGEN): ICD-10-CM

## 2025-02-03 DIAGNOSIS — E55.9 VITAMIN D DEFICIENCY: ICD-10-CM

## 2025-02-03 DIAGNOSIS — I70.244 ATHEROSCLEROSIS OF NATIVE ARTERIES OF LEFT LEG WITH ULCERATION OF HEEL AND MIDFOOT: ICD-10-CM

## 2025-02-03 DIAGNOSIS — Z23 NEED FOR VACCINATION WITH 20-POLYVALENT PNEUMOCOCCAL CONJUGATE VACCINE: ICD-10-CM

## 2025-02-03 DIAGNOSIS — L97.521 NON-PRESSURE CHRONIC ULCER OF OTHER PART OF LEFT FOOT LIMITED TO BREAKDOWN OF SKIN: ICD-10-CM

## 2025-02-03 DIAGNOSIS — I82.4Y3 DEEP VEIN THROMBOSIS (DVT) OF PROXIMAL VEIN OF BOTH LOWER EXTREMITIES, UNSPECIFIED CHRONICITY: ICD-10-CM

## 2025-02-03 LAB
25(OH)D3+25(OH)D2 SERPL-MCNC: 32 NG/ML (ref 30–80)
ALBUMIN SERPL-MCNC: 3.9 G/DL (ref 3.5–5)
ALBUMIN/GLOB SERPL: 1.4 RATIO (ref 1.1–2)
ALP SERPL-CCNC: 109 UNIT/L (ref 40–150)
ALT SERPL-CCNC: 15 UNIT/L (ref 0–55)
ANION GAP SERPL CALC-SCNC: 8 MEQ/L
AST SERPL-CCNC: 16 UNIT/L (ref 5–34)
BACTERIA #/AREA URNS AUTO: ABNORMAL /HPF
BILIRUB SERPL-MCNC: 0.4 MG/DL
BILIRUB UR QL STRIP.AUTO: NEGATIVE
BUN SERPL-MCNC: 20.1 MG/DL (ref 8.4–25.7)
CALCIUM SERPL-MCNC: 8.8 MG/DL (ref 8.4–10.2)
CHLORIDE SERPL-SCNC: 108 MMOL/L (ref 98–107)
CHOLEST SERPL-MCNC: 201 MG/DL
CHOLEST/HDLC SERPL: 5 {RATIO} (ref 0–5)
CLARITY UR: CLEAR
CO2 SERPL-SCNC: 23 MMOL/L (ref 22–29)
COLOR UR AUTO: ABNORMAL
CREAT SERPL-MCNC: 0.96 MG/DL (ref 0.72–1.25)
CREAT/UREA NIT SERPL: 21
EST. AVERAGE GLUCOSE BLD GHB EST-MCNC: 114 MG/DL
GFR SERPLBLD CREATININE-BSD FMLA CKD-EPI: >60 ML/MIN/1.73/M2
GLOBULIN SER-MCNC: 2.7 GM/DL (ref 2.4–3.5)
GLUCOSE SERPL-MCNC: 100 MG/DL (ref 74–100)
GLUCOSE UR QL STRIP: NORMAL
HBA1C MFR BLD: 5.6 %
HDLC SERPL-MCNC: 44 MG/DL (ref 35–60)
HGB UR QL STRIP: NEGATIVE
KETONES UR QL STRIP: NEGATIVE
LDLC SERPL CALC-MCNC: 137 MG/DL (ref 50–140)
LEUKOCYTE ESTERASE UR QL STRIP: NEGATIVE
MUCOUS THREADS URNS QL MICRO: ABNORMAL /LPF
NITRITE UR QL STRIP: NEGATIVE
PH UR STRIP: 6 [PH]
POTASSIUM SERPL-SCNC: 4.5 MMOL/L (ref 3.5–5.1)
PROT SERPL-MCNC: 6.6 GM/DL (ref 6.4–8.3)
PROT UR QL STRIP: NEGATIVE
PSA SERPL-MCNC: 1.03 NG/ML
RBC #/AREA URNS AUTO: ABNORMAL /HPF
SODIUM SERPL-SCNC: 139 MMOL/L (ref 136–145)
SP GR UR STRIP.AUTO: 1.02 (ref 1–1.03)
SQUAMOUS #/AREA URNS LPF: ABNORMAL /HPF
TRIGL SERPL-MCNC: 98 MG/DL (ref 34–140)
TSH SERPL-ACNC: 0.95 UIU/ML (ref 0.35–4.94)
UROBILINOGEN UR STRIP-ACNC: NORMAL
VLDLC SERPL CALC-MCNC: 20 MG/DL
WBC #/AREA URNS AUTO: ABNORMAL /HPF

## 2025-02-03 PROCEDURE — 1159F MED LIST DOCD IN RCRD: CPT | Mod: CPTII,,, | Performed by: FAMILY MEDICINE

## 2025-02-03 PROCEDURE — 3078F DIAST BP <80 MM HG: CPT | Mod: CPTII,,, | Performed by: FAMILY MEDICINE

## 2025-02-03 PROCEDURE — 36415 COLL VENOUS BLD VENIPUNCTURE: CPT | Performed by: FAMILY MEDICINE

## 2025-02-03 PROCEDURE — 99396 PREV VISIT EST AGE 40-64: CPT | Mod: 25,,, | Performed by: FAMILY MEDICINE

## 2025-02-03 PROCEDURE — 3008F BODY MASS INDEX DOCD: CPT | Mod: CPTII,,, | Performed by: FAMILY MEDICINE

## 2025-02-03 PROCEDURE — 3074F SYST BP LT 130 MM HG: CPT | Mod: CPTII,,, | Performed by: FAMILY MEDICINE

## 2025-02-03 PROCEDURE — 1160F RVW MEDS BY RX/DR IN RCRD: CPT | Mod: CPTII,,, | Performed by: FAMILY MEDICINE

## 2025-02-03 PROCEDURE — 90656 IIV3 VACC NO PRSV 0.5 ML IM: CPT | Mod: ,,, | Performed by: FAMILY MEDICINE

## 2025-02-03 PROCEDURE — 90471 IMMUNIZATION ADMIN: CPT | Mod: ,,, | Performed by: FAMILY MEDICINE

## 2025-02-03 NOTE — PROGRESS NOTES
Patient ID: [id number]     Chief Complaint: Annual Exam        HPI:     Artur Oquendo III is a 54 y.o. male here today for annual wellness exam.   Well Adult History   The patient presents for well adult exam, The patient's general health status is described as good. The patient's diet is described as balanced. Exercise: occasional. Associated symptoms consist of denies weight loss, denies weight gain, denies fatigue, denies headache, reports hearing loss (chronic and stable) and denies vision changes. Additional pertinent history: last dental exam: goes every 6 months, last eye exam: 12/2024, with Ophthalmology- Dr. Allan), seat belt use, occasional caffeine use (tea/soft drinks), tobacco use none, social alcohol use and He is due for annual wellness labs. Last Cologuard: 06/24/2024, Negative, due for repeat in 06/2027. He needs flu vaccine today and would like Rx for Prevnar-20, but he is UTD on all other vaccines. He is compliant with Vascular (Dr. Mcdonough) followup as scheduled for Coumadin management due to history of PE/DVT s/p IV Filter/atherosclerosis with healed wound on left foot, asymptomatic, stable with Rx, no side effects. Her reports that sleep apnea symptoms resolved s/p deviated septum repair with ENT (Dr. Tanner), he does followup with ENT as scheduled. He has hearing loss and sees [facility] for hearing aids.  - Patient is without any other complaints today.      Advance Care Planning     Date: 02/03/2025    Living Will  During this visit, I engaged the patient  in the voluntary advance care planning process.  The patient and I reviewed the role for advance directives and their purpose in directing future healthcare if the patient's unable to speak for him/herself.  At this point in time, the patient does have full decision-making capacity.  We discussed different extreme health states that he could experience, and reviewed what kind of medical care he would want in those  situations.  The patient communicated that if he were comatose and had little chance of a meaningful recovery, he would want machines/life-sustaining treatments used. In addition to the above preference, other important end-of-life issues for the patient include  patient is not an organ donor . The patient has completed a living will to reflect these preferences.  I spent a total of 5 minutes engaging the patient in this advance care planning discussion.              No past medical history on file.     History reviewed. No pertinent surgical history.    Review of patient's allergies indicates:  No Known Allergies    Outpatient Medications Marked as Taking for the 2/3/25 encounter (Office Visit) with Stacy Kemp MD   Medication Sig Dispense Refill    warfarin (COUMADIN) 4 MG tablet Take 4 mg by mouth Daily.       Current Facility-Administered Medications for the 2/3/25 encounter (Office Visit) with Stacy Kemp MD   Medication Dose Route Frequency Provider Last Rate Last Admin    influenza (Flulaval, Fluzone, Fluarix) 45 mcg/0.5 mL IM vaccine (> or = 6 mo) 0.5 mL  0.5 mL Intramuscular 1 time in Clinic/HOD            Social History     Socioeconomic History    Marital status: Other   Tobacco Use    Smoking status: Never    Smokeless tobacco: Never   Substance and Sexual Activity    Alcohol use: Not Currently     Comment: Once in a while    Drug use: Never    Sexual activity: Yes     Partners: Female     Birth control/protection: None     Social Drivers of Health     Financial Resource Strain: Low Risk  (1/31/2024)    Overall Financial Resource Strain (CARDIA)     Difficulty of Paying Living Expenses: Not hard at all   Food Insecurity: No Food Insecurity (1/31/2024)    Hunger Vital Sign     Worried About Running Out of Food in the Last Year: Never true     Ran Out of Food in the Last Year: Never true   Transportation Needs: No Transportation Needs (1/31/2024)    PRAPARE - Transportation     Lack  of Transportation (Medical): No     Lack of Transportation (Non-Medical): No   Physical Activity: Inactive (1/31/2024)    Exercise Vital Sign     Days of Exercise per Week: 0 days     Minutes of Exercise per Session: 0 min   Stress: No Stress Concern Present (1/31/2024)    Citizen of [place] [employer] - Occupational Stress Questionnaire     Feeling of Stress : Not at all   Housing Stability: Low Risk  (1/31/2024)    Housing Stability Vital Sign     Unable to Pay for Housing in the Last Year: No     Number of Places Lived in the Last Year: 1     Unstable Housing in the Last Year: No        No family history on file.     Subjective:       Review of Systems:    See HPI for details    Constitutional: No fatigue, No fever, No chills.   Eye: No blurring, No visual disturbances.   Ear/Nose/Mouth/Throat: No ear pain, No nasal congestion, No sore throat. Decreased hearing: Bilaterally.   Respiratory: No shortness of breath, No cough, No wheezing.   Cardiovascular: No chest pain, No palpitations, No peripheral edema.   Gastrointestinal: No nausea, No vomiting, No diarrhea, No constipation, No abdominal pain.   Genitourinary: No dysuria, No hematuria.   Hematology/Lymphatics: No bruising tendency, No bleeding tendency, No swollen lymph glands.   Endocrine: No excessive thirst, No polyuria, No excessive hunger.   Musculoskeletal: No joint pain, No muscle pain, No decreased range of motion.   Integumentary: No rash, No pruritus.   Neurologic: No abnormal balance, No confusion, No headache.   Psychiatric: No sleeping problems, No anxiety, No depression, Not suicidal, No hallucinations.     All Other ROS: Negative except as stated in HPI.       Objective:     /67 (BP Location: Right arm, Patient Position: Sitting)   Pulse (!) 54   Temp 97.9 °F (36.6 °C) (Oral)   Resp 16   Ht 6' (1.829 m)   Wt 89.8 kg (198 lb)   SpO2 98%   BMI 26.85 kg/m²     Physical Exam      General: Alert and oriented, No acute  distress. Overweight.  Eye: Pupils are equal, round and reactive to light, Extraocular movements are intact, Normal conjunctiva.   HENT: Normocephalic, Tympanic membranes are clear, Normal hearing, Oral mucosa is moist, No pharyngeal erythema, Hearing aids in place.   Throat: Pharynx ( Not edematous, No exudate ).   Neck: Supple, Non-tender, No carotid bruit, No lymphadenopathy, No thyromegaly.   Respiratory: Lungs are clear to auscultation, Respirations are non-labored, Breath sounds are equal, Symmetrical chest wall expansion, No chest wall tenderness.   Cardiovascular: Normal rate, Regular rhythm, No murmur, Good pulses equal in all extremities, No edema.   Gastrointestinal: Soft, Non-tender, Non-distended, Normal bowel sounds, No organomegaly.   Genitourinary: No costovertebral angle tenderness.   Musculoskeletal: Normal range of motion, No tenderness, Normal gait.   Lower extremity exam: Lower leg ( Bilateral, Moderate, Varicose veins, venous stasis changes ).   Neurologic: No focal deficits, Cranial Nerves II-XII are grossly intact.   Psychiatric: Cooperative, Appropriate mood & affect, Normal judgment, Non-suicidal.   Mood and affect: Calm.   Behavior: Relaxed.         Assessment:       ICD-10-CM ICD-9-CM   1. Wellness examination  Z00.00 V70.0   2. Screening PSA (prostate specific antigen)  Z12.5 V76.44   3. Deep vein thrombosis (DVT) of proximal vein of both lower extremities, unspecified chronicity  I82.4Y3 453.41   4. Atherosclerosis of native arteries of left leg with ulceration of heel and midfoot  I70.244 440.23     707.10   5. Current use of long term anticoagulation  Z79.01 V58.61   6. Vitamin D deficiency  E55.9 268.9   7. Flu vaccine need  Z23 V04.81   8. Need for vaccination with 20-polyvalent pneumococcal conjugate vaccine  Z23 V03.82   9. Non-pressure chronic ulcer of other part of left foot limited to breakdown of skin  L97.521 707.15        Plan:     Problem List Items Addressed This Visit           Cardiac/Vascular    Atherosclerosis of native arteries of left leg with ulceration of heel and midfoot       Orthopedic    Non-pressure chronic ulcer of other part of left foot limited to breakdown of skin     Other Visit Diagnoses       Wellness examination    -  Primary    Relevant Orders    CBC Auto Differential    Comprehensive Metabolic Panel    Hemoglobin A1C    Lipid Panel    TSH    Urinalysis, Reflex to Urine Culture    Screening PSA (prostate specific antigen)        Relevant Orders    PSA, Screening    Deep vein thrombosis (DVT) of proximal vein of both lower extremities, unspecified chronicity        Current use of long term anticoagulation        Vitamin D deficiency        Relevant Orders    Vitamin D    Flu vaccine need        Relevant Medications    influenza (Flulaval, Fluzone, Fluarix) 45 mcg/0.5 mL IM vaccine (> or = 6 mo) 0.5 mL    Need for vaccination with 20-polyvalent pneumococcal conjugate vaccine        Relevant Medications    pneumoc 20-ting conj-dip cr,PF, (PREVNAR-20, PF,) 0.5 mL Syrg injection         1. Wellness examination  - CBC Auto Differential; Future  - Comprehensive Metabolic Panel; Future  - Hemoglobin A1C; Future  - Lipid Panel; Future  - TSH; Future  - Urinalysis, Reflex to Urine Culture; Future  - Will treat pending lab results. Continue healthy diet and exercise plan. Keep appointment for dental exams x q6 months as scheduled. Keep appointment for annual eye exam as scheduled. Keep appointment with specialists as scheduled. Notify M.D. or ER if temp greater than 100.4, or any acute illness.       2. Screening PSA (prostate specific antigen)  - PSA, Screening; Future    3. Deep vein thrombosis (DVT) of proximal vein of both lower extremities, unspecified chronicity  - Continue followup with Vascular as scheduled and continue current treatment plan. Notify M.D. or ER if symptoms persist or worsen, SOB, extremity swelling, active bleeding, temp >100.4, or any acute  illness.       4. Atherosclerosis of native arteries of left leg with ulceration of heel and midfoot  - Continue followup with Vascular as scheduled and continue current treatment plan. Notify M.D. or ER if symptoms persist or worsen, SOB, extremity swelling, active bleeding, temp >100.4, or any acute illness.       5. Current use of long term anticoagulation  - Continue followup with Vascular as scheduled and continue current treatment plan. Notify M.D. or ER if symptoms persist or worsen, SOB, extremity swelling, active bleeding, temp >100.4, or any acute illness.       6. Vitamin D deficiency  - Vitamin D; Future    7. Flu vaccine need  - influenza (Flulaval, Fluzone, Fluarix) 45 mcg/0.5 mL IM vaccine (> or = 6 mo) 0.5 mL IM x 1 today    8. Need for vaccination with 20-polyvalent pneumococcal conjugate vaccine  - Written Rx for pneumoc 20-ting conj-dip cr,PF, (PREVNAR-20, PF,) 0.5 mL Syrg injection; Inject 0.5 mLs into the muscle once. for 1 dose  Dispense: 0.5 mL; Refill: 0 given to patient to bring to local pharmacy, patient voices understanding.    9. Non-pressure chronic ulcer of other part of left foot limited to breakdown of skin  - Resolved, continue followup with Vascular as scheduled and continue current treatment plan. Notify M.D. or ER if symptoms persist or worsen, SOB, extremity swelling, active bleeding, temp >100.4, or any acute illness.         Artur was seen today for annual exam.    Diagnoses and all orders for this visit:    Wellness examination  -     CBC Auto Differential; Future  -     Comprehensive Metabolic Panel; Future  -     Hemoglobin A1C; Future  -     Lipid Panel; Future  -     TSH; Future  -     Urinalysis, Reflex to Urine Culture; Future    Screening PSA (prostate specific antigen)  -     PSA, Screening; Future    Deep vein thrombosis (DVT) of proximal vein of both lower extremities, unspecified chronicity    Atherosclerosis of native arteries of left leg with ulceration of heel  and midfoot    Current use of long term anticoagulation    Vitamin D deficiency  -     Vitamin D; Future    Flu vaccine need  -     influenza (Flulaval, Fluzone, Fluarix) 45 mcg/0.5 mL IM vaccine (> or = 6 mo) 0.5 mL    Need for vaccination with 20-polyvalent pneumococcal conjugate vaccine  -     pneumoc 20-jonas conj-dip cr,PF, (PREVNAR-20, PF,) 0.5 mL Syrg injection; Inject 0.5 mLs into the muscle once. for 1 dose    Non-pressure chronic ulcer of other part of left foot limited to breakdown of skin          Medication List with Changes/Refills   New Medications    PNEUMOC 20-JONAS CONJ-DIP CR,PF, (PREVNAR-20, PF,) 0.5 ML SYRG INJECTION    Inject 0.5 mLs into the muscle once. for 1 dose       Start Date: 2/3/2025  End Date: 2/3/2025   Current Medications    WARFARIN (COUMADIN) 4 MG TABLET    Take 4 mg by mouth Daily.       Start Date: 12/30/2022End Date: --          Follow up in about 1 year (around 2/3/2026) for Wellness.

## 2025-02-04 ENCOUNTER — TELEPHONE (OUTPATIENT)
Dept: FAMILY MEDICINE | Facility: CLINIC | Age: 55
End: 2025-02-04
Payer: COMMERCIAL

## 2025-02-04 NOTE — TELEPHONE ENCOUNTER
----- Message from Stacy Kemp MD sent at 2/3/2025  5:53 PM CST -----  CBC NORMAL-Your CBC appears stable at this time with no major anemia, abnormal white count, or platelet abnormality.    Thank you for choosing [facility] for your medical needs. Have a great day!    -Stacy Kemp MD, FAAFP

## 2025-02-04 NOTE — TELEPHONE ENCOUNTER
----- Message from Stacy Kemp MD sent at 2/3/2025  7:46 PM CST -----  A1C NORMAL-Your A1c is at goal. There are no signs that diabetes or prediabetes is present, please repeat A1C testing with annual wellness labs , CBC NORMAL-Your CBC appears stable at this time with no major anemia, abnormal white count, or platelet abnormality., KIDNEY NORMAL- Your kidney function is in the normal range. , LIPID NORMAL-Your cholesterol levels appear to be at target. Recommend recheck in a year.  , LIVER NORMAL-Your liver enzymes are in the recommended range.  , TSH NORMAL-Your TSH level is normal and this indicates that your thyroid function is at goal., and UA NORMAL- Your urinalysis did not show any significant abnormalities.     Remaining labs are essentially normal.     Thank you for choosing [facility] for your medical needs. Have a great day!    -Stacy Kemp MD, FAAFP